# Patient Record
Sex: FEMALE | Race: OTHER | NOT HISPANIC OR LATINO | URBAN - METROPOLITAN AREA
[De-identification: names, ages, dates, MRNs, and addresses within clinical notes are randomized per-mention and may not be internally consistent; named-entity substitution may affect disease eponyms.]

---

## 2017-11-30 ENCOUNTER — EMERGENCY (EMERGENCY)
Facility: HOSPITAL | Age: 47
LOS: 1 days | Discharge: HOME | End: 2017-11-30
Attending: EMERGENCY MEDICINE | Admitting: EMERGENCY MEDICINE
Payer: MEDICAID

## 2017-11-30 VITALS
TEMPERATURE: 98 F | OXYGEN SATURATION: 100 % | DIASTOLIC BLOOD PRESSURE: 91 MMHG | HEART RATE: 51 BPM | RESPIRATION RATE: 18 BRPM | SYSTOLIC BLOOD PRESSURE: 152 MMHG

## 2017-11-30 VITALS
OXYGEN SATURATION: 99 % | TEMPERATURE: 98 F | SYSTOLIC BLOOD PRESSURE: 134 MMHG | HEIGHT: 62 IN | RESPIRATION RATE: 18 BRPM | WEIGHT: 171.08 LBS | HEART RATE: 60 BPM | DIASTOLIC BLOOD PRESSURE: 80 MMHG

## 2017-11-30 PROCEDURE — 72040 X-RAY EXAM NECK SPINE 2-3 VW: CPT

## 2017-11-30 PROCEDURE — 72040 X-RAY EXAM NECK SPINE 2-3 VW: CPT | Mod: 26

## 2017-11-30 PROCEDURE — 99283 EMERGENCY DEPT VISIT LOW MDM: CPT

## 2017-11-30 PROCEDURE — 99283 EMERGENCY DEPT VISIT LOW MDM: CPT | Mod: 25

## 2017-11-30 RX ORDER — CYCLOBENZAPRINE HYDROCHLORIDE 10 MG/1
10 TABLET, FILM COATED ORAL ONCE
Qty: 0 | Refills: 0 | Status: COMPLETED | OUTPATIENT
Start: 2017-11-30 | End: 2017-11-30

## 2017-11-30 RX ORDER — CYCLOBENZAPRINE HYDROCHLORIDE 10 MG/1
1 TABLET, FILM COATED ORAL
Qty: 21 | Refills: 0 | OUTPATIENT
Start: 2017-11-30

## 2017-11-30 RX ADMIN — Medication 500 MILLIGRAM(S): at 10:25

## 2017-11-30 RX ADMIN — CYCLOBENZAPRINE HYDROCHLORIDE 10 MILLIGRAM(S): 10 TABLET, FILM COATED ORAL at 10:25

## 2017-11-30 NOTE — ED ADULT NURSE REASSESSMENT NOTE - NS ED NURSE REASSESS COMMENT FT1
patient is discharged with dc instruction and prescription, ambulates to dc, Pt is in no acute distress. Pt is in no acute distress.

## 2017-11-30 NOTE — ED ADULT TRIAGE NOTE - TEMPERATURE IN FAHRENHEIT (DEGREES F)
98.3
Pt sent from assisted living for unwitnessed fall.  Pt A&Ox2, confused at times. Pt does not recall how she fell or how staff found her. Patient c/o Left hip pain. Lt leg shortened and rotated. + pedal pulse.

## 2017-11-30 NOTE — ED PROVIDER NOTE - MEDICAL DECISION MAKING DETAILS
46 yo female with stiff neck  for 2 days. PE reveals spasm and tenderness left post trapezius. Plan - cspine xray, NSAIDS, muscle relaxants, ortho follow up.

## 2017-11-30 NOTE — ED PROVIDER NOTE - OBJECTIVE STATEMENT
45 yo female co post neck pain x 2days. Denies injury or precipitating event. Took advil at home without improvement. Denies other symptom or problem.

## 2024-05-22 NOTE — ED ADULT NURSE NOTE - PYSCHOSOCIAL ASSESSMENT
Transitional Care Management weekly Telephone Call Attempt    Discharge Date: 4/26/24  Discharge Location: [facility]: [facility]    Call Attempt Date: 5/22/2024  Call Attempt: Second    WDL
